# Patient Record
Sex: MALE | Race: WHITE | NOT HISPANIC OR LATINO
[De-identification: names, ages, dates, MRNs, and addresses within clinical notes are randomized per-mention and may not be internally consistent; named-entity substitution may affect disease eponyms.]

---

## 2017-03-08 PROBLEM — Z00.00 ENCOUNTER FOR PREVENTIVE HEALTH EXAMINATION: Status: ACTIVE | Noted: 2017-03-08

## 2017-03-09 ENCOUNTER — APPOINTMENT (OUTPATIENT)
Dept: SURGERY | Facility: CLINIC | Age: 27
End: 2017-03-09

## 2017-04-23 ENCOUNTER — EMERGENCY (EMERGENCY)
Facility: HOSPITAL | Age: 27
LOS: 0 days | Discharge: LEFT BEFORE DISCHARGE | End: 2017-04-23

## 2017-06-27 DIAGNOSIS — K61.0 ANAL ABSCESS: ICD-10-CM

## 2017-06-27 DIAGNOSIS — K52.9 NONINFECTIVE GASTROENTERITIS AND COLITIS, UNSPECIFIED: ICD-10-CM

## 2017-06-27 DIAGNOSIS — Z91.018 ALLERGY TO OTHER FOODS: ICD-10-CM

## 2020-03-15 ENCOUNTER — EMERGENCY (EMERGENCY)
Facility: HOSPITAL | Age: 30
LOS: 0 days | Discharge: HOME | End: 2020-03-15
Admitting: EMERGENCY MEDICINE
Payer: OTHER MISCELLANEOUS

## 2020-03-15 VITALS
HEIGHT: 66 IN | DIASTOLIC BLOOD PRESSURE: 80 MMHG | OXYGEN SATURATION: 98 % | HEART RATE: 97 BPM | TEMPERATURE: 97 F | RESPIRATION RATE: 16 BRPM | SYSTOLIC BLOOD PRESSURE: 132 MMHG | WEIGHT: 199.96 LBS

## 2020-03-15 DIAGNOSIS — S82.891A OTHER FRACTURE OF RIGHT LOWER LEG, INITIAL ENCOUNTER FOR CLOSED FRACTURE: ICD-10-CM
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
## 2020-03-15 PROCEDURE — 73610 X-RAY EXAM OF ANKLE: CPT | Mod: 26,RT

## 2020-03-15 PROCEDURE — 73630 X-RAY EXAM OF FOOT: CPT | Mod: 26,RT

## 2020-03-15 PROCEDURE — 99284 EMERGENCY DEPT VISIT MOD MDM: CPT | Mod: 25

## 2020-03-15 PROCEDURE — 29515 APPLICATION SHORT LEG SPLINT: CPT

## 2020-03-15 NOTE — ED PROVIDER NOTE - OBJECTIVE STATEMENT
Pt is a 30y/o male with a pmhx of crohn's dz presents today for eval of right ankle pain s/p inversion injury that occurred yesterday while at work. Pt presents today for persistent pain. Pt denies fever, chills, weakness, head trauma, LOC.

## 2020-03-15 NOTE — ED PROVIDER NOTE - PATIENT PORTAL LINK FT
You can access the FollowMyHealth Patient Portal offered by [facility] by registering at the following website: [email or web address]. By joining [facility]’s FollowMyHealth portal, you will also be able to view your health information using other applications (apps) compatible with our system.

## 2020-03-15 NOTE — ED PROVIDER NOTE - CARE PROVIDERS DIRECT ADDRESSES
[email or web address]

## 2020-03-15 NOTE — ED PROVIDER NOTE - NSFOLLOWUPINSTRUCTIONS_ED_ALL_ED_FT
Ankle Fracture    WHAT YOU NEED TO KNOW:    An ankle fracture is a break in 1 or more of the bones in your ankle. Foot Anatomy         DISCHARGE INSTRUCTIONS:    Call 911 for any of the following:     You feel lightheaded, short of breath, and have chest pain.      You cough up blood.    Return to the emergency department if:     Your leg feels warm, tender, and painful. It may look swollen and red.      Blood soaks through your bandage.      You have severe pain in your ankle.      Your cast feels too tight.      Your foot or toes are cold or numb.      Your foot or toenails turn blue or gray.    Contact your healthcare provider if:     Your splint feels too tight.      Your swelling has increased or returned.      You have a fever.      Your pain does not go away, even after treatment.      You have questions or concerns about your condition or care.    Medicines: You may need any of the following:     Acetaminophen decreases pain and fever. It is available without a doctor's order. Ask how much to take and how often to take it. Follow directions. Acetaminophen can cause liver damage if not taken correctly.      NSAIDs, such as ibuprofen, help decrease swelling, pain, and fever. This medicine is available with or without a doctor's order. NSAIDs can cause stomach bleeding or kidney problems in certain people. If you take blood thinner medicine, always ask your healthcare provider if NSAIDs are safe for you. Always read the medicine label and follow directions.      Prescription pain medicine may be given. Ask your healthcare provider how to take this medicine safely.      Take your medicine as directed. Contact your healthcare provider if you think your medicine is not helping or if you have side effects. Tell him or her if you are allergic to any medicine. Keep a list of the medicines, vitamins, and herbs you take. Include the amounts, and when and why you take them. Bring the list or the pill bottles to follow-up visits. Carry your medicine list with you in case of an emergency.    Follow up with your healthcare provider in 1 to 2 days: Your fracture may need to be reduced (bones pushed back into place) or you may need surgery. Write down your questions so you remember to ask them during your visits.     Support devices: You will be given a brace, cast, or splint to limit your movement and protect your ankle. You may need to use crutches to protect your ankle and decrease your pain as you move around. Do not remove your device and do not put weight on your injured ankle.    Splint and cast care: Cover the splint or cast before you bathe so it does not get wet. Tape 2 plastic trash bags to your skin above the cast. Try to keep your ankle out of the water as much as possible.    Rest: Rest your ankle so that it can heal. Return to normal activities as directed.    Ice: Apply ice on your ankle for 15 to 20 minutes every hour or as directed. Use an ice pack, or put crushed ice in a plastic bag. Cover it with a towel. Ice helps prevent tissue damage and decreases swelling and pain.    Elevate: Elevate your ankle above the level of your heart as often as you can. This will help decrease swelling and pain. Prop your ankle on pillows or blankets to keep it elevated comfortably.        © Copyright [employer] 2019 All illustrations and images included in CareNotes are the copyrighted property of [employer] or [employer].

## 2020-03-15 NOTE — ED PROVIDER NOTE - PHYSICAL EXAMINATION
VITAL SIGNS: I have reviewed nursing notes and confirm.  CONSTITUTIONAL: Well-developed; well-nourished; in no acute distress.   SKIN: skin exam is warm and dry, no acute rash.    HEAD: Normocephalic; atraumatic.  EYES:  conjunctiva and sclera clear.  ENT: No nasal discharge; airway clear.  EXT: mild edema and TTP to lateral/medial right malleolus pedal pulses present, sensation intact Normal ROM.  No clubbing, cyanosis   NEURO: Alert, oriented, grossly unremarkable

## 2020-03-15 NOTE — ED PROVIDER NOTE - CARE PROVIDER_API CALL
Gabriel Ambriz)  Orthopaedic Surgery  [address] [place] [id number]  Phone: [phone]  Fax: [phone]  Follow Up Time:     Clint Greene)  Orthopaedic Surgery  [address] [place] [address], [address]  [place] [address]  Phone: [phone]  Fax: [phone]  Follow Up Time:

## 2020-03-15 NOTE — ED ADULT TRIAGE NOTE - CHIEF COMPLAINT QUOTE
"I got hurt at work last night. I was stepping off the back of the truck and I twisted my right ankle."

## 2021-12-20 ENCOUNTER — TRANSCRIPTION ENCOUNTER (OUTPATIENT)
Age: 31
End: 2021-12-20

## 2024-04-12 ENCOUNTER — APPOINTMENT (OUTPATIENT)
Dept: ORTHOPEDIC SURGERY | Facility: CLINIC | Age: 34
End: 2024-04-12
Payer: OTHER MISCELLANEOUS

## 2024-04-12 VITALS — BODY MASS INDEX: 33.75 KG/M2 | WEIGHT: 210 LBS | HEIGHT: 66 IN

## 2024-04-12 PROCEDURE — 29130 APPL FINGER SPLINT STATIC: CPT

## 2024-04-12 PROCEDURE — 99203 OFFICE O/P NEW LOW 30 MIN: CPT | Mod: ACP

## 2024-04-12 RX ORDER — USTEKINUMAB 130 MG/26ML
SOLUTION INTRAVENOUS
Refills: 0 | Status: ACTIVE | COMMUNITY

## 2024-04-12 RX ORDER — SULFASALAZINE 500 MG/1
TABLET ORAL
Refills: 0 | Status: ACTIVE | COMMUNITY

## 2024-04-12 NOTE — DATA REVIEWED
[FreeTextEntry1] : X-ray images were obtained at [facility] and viewed on PACS here.  AP, lateral, oblique views of the right little finger reveal a distal phalanx fracture that is nondisplaced

## 2024-04-12 NOTE — DISCUSSION/SUMMARY
[de-identified] : Patient is a fracture of the distal phalanx of right little finger.  Today, I placed patient in a stack splint with Coban.  Gave patient additional Coban to change for hygiene purposes.  Patient can take off to apply ice to the area.  Patient will return to work, however limited duty for the next 2 weeks.  Patient was encouraged to elevate the finger for the next few days to avoid any more swelling.  Advised that this can take 4 to 6 weeks to fully heal.  Follow-up in 2 weeks for x-rays and further assessment.  Patient agrees to above plan all questions were answered today

## 2024-04-12 NOTE — PHYSICAL EXAM
[de-identified] : Physical exam of the right little finger: -Ecchymosis and swelling over distal phalanx, no subungual hematoma noted.  Skin intact -TTP over the distal phalanx -Patient has full range of motion at all joints of the finger, pain with flexion at the DIP joint -Ligaments stable -+2 radial pulse, sensation intact to light touch

## 2024-04-12 NOTE — HISTORY OF PRESENT ILLNESS
[de-identified] : 33-year-old male presents for right pinky injury.  Patient works for [employer], yesterday patient's finger got stuck between a pallet and side of the truck.  Patient went to [facility] where x-rays were taken and a fracture of the distal phalanx was identified.  Patient was placed in a metal splint.  Patient states there is no pain when he is resting and immobilizing it.  Pain with any range of motion or touching the area.  Patient denies any past injuries or surgeries to the area.  Patient has Crohn's disease does not take anti-inflammatories.  Has been taking Tylenol as needed for pain.  Patient is right-hand dominant.

## 2024-04-26 ENCOUNTER — APPOINTMENT (OUTPATIENT)
Dept: ORTHOPEDIC SURGERY | Facility: CLINIC | Age: 34
End: 2024-04-26
Payer: OTHER MISCELLANEOUS

## 2024-04-26 DIAGNOSIS — S62.636A DISPLACED FRACTURE OF DISTAL PHALANX OF RIGHT LITTLE FINGER, INITIAL ENCOUNTER FOR CLOSED FRACTURE: ICD-10-CM

## 2024-04-26 PROCEDURE — 99213 OFFICE O/P EST LOW 20 MIN: CPT | Mod: ACP

## 2024-04-26 PROCEDURE — 73140 X-RAY EXAM OF FINGER(S): CPT | Mod: RT

## 2024-04-26 NOTE — PHYSICAL EXAM
[de-identified] : Physical exam of the right little finger: -Ecchymosis and swelling over distal phalanx, no subungual hematoma noted.  Skin intact -TTP over the distal phalanx -Patient has full range of motion at all joints of the finger, pain with flexion at the DIP joint -Ligaments stable -+2 radial pulse, sensation intact to light touch

## 2024-04-26 NOTE — DISCUSSION/SUMMARY
[de-identified] : Patient is a fracture of the distal phalanx of right little finger.  He can discontinue splinting as x-rays are acceptable alignment of fracture pattern and he has no pain.  Great range of motion.  Red flag symptoms discussed.  Encourage gentle range of motion activity modification.  He can return to work on light duty/with restrictions.  See him back in 4 weeks for final evaluation and x-rays.  Red flag symptoms discussed. All questions and concerns addressed to patient's satisfaction. Patient expresses full understanding of treatment plan.

## 2024-04-26 NOTE — HISTORY OF PRESENT ILLNESS
patient was off the floor    was in the room  will revisit in am.           Electronically Signed On 04.27.2020 12:53  ___________________________________________________   Arianna Chinchilla MD     [de-identified] : 33-year-old male presents for right pinky injury.  Patient works for [employer], yesterday patient's finger got stuck between a pallet and side of the truck.  Patient went to [facility] where x-rays were taken and a fracture of the distal phalanx was identified.  Patient was placed in a metal splint.  Patient states there is no pain when he is resting and immobilizing it.  He has returned to work.  He is doing well overall.  Is been working on light duty and would like to continue working.

## 2024-04-26 NOTE — DATA REVIEWED
[FreeTextEntry1] : X-ray images were obtained at [facility] and viewed on PACS here.  AP, lateral, oblique views of the right little finger reveal a distal phalanx fracture that is nondisplaced

## 2024-05-03 ENCOUNTER — NON-APPOINTMENT (OUTPATIENT)
Age: 34
End: 2024-05-03

## 2024-05-24 ENCOUNTER — APPOINTMENT (OUTPATIENT)
Dept: ORTHOPEDIC SURGERY | Facility: CLINIC | Age: 34
End: 2024-05-24